# Patient Record
Sex: FEMALE | Race: WHITE | Employment: FULL TIME | URBAN - METROPOLITAN AREA
[De-identification: names, ages, dates, MRNs, and addresses within clinical notes are randomized per-mention and may not be internally consistent; named-entity substitution may affect disease eponyms.]

---

## 2018-02-22 ENCOUNTER — APPOINTMENT (OUTPATIENT)
Dept: CT IMAGING | Age: 43
DRG: 392 | End: 2018-02-22

## 2018-02-22 ENCOUNTER — HOSPITAL ENCOUNTER (INPATIENT)
Age: 43
LOS: 6 days | Discharge: HOME | DRG: 392 | End: 2018-02-28
Attending: EMERGENCY MEDICINE | Admitting: INTERNAL MEDICINE

## 2018-02-22 DIAGNOSIS — K57.92 ACUTE DIVERTICULITIS: ICD-10-CM

## 2018-02-22 DIAGNOSIS — R10.30 LOWER ABDOMINAL PAIN: Primary | ICD-10-CM

## 2018-02-22 PROBLEM — K57.33 DIVERTICULITIS LARGE INTESTINE W/O PERFORATION OR ABSCESS W/BLEEDING: Status: ACTIVE | Noted: 2018-02-22

## 2018-02-22 PROBLEM — Z87.19 HX OF DIVERTICULITIS OF COLON: Status: ACTIVE | Noted: 2018-02-22

## 2018-02-22 LAB
-: ABNORMAL
ABSOLUTE EOS #: 0.2 K/UL (ref 0–0.4)
ABSOLUTE IMMATURE GRANULOCYTE: ABNORMAL K/UL (ref 0–0.3)
ABSOLUTE LYMPH #: 2.4 K/UL (ref 1–4.8)
ABSOLUTE MONO #: 1.4 K/UL (ref 0.1–1.3)
ALBUMIN SERPL-MCNC: 4.1 G/DL (ref 3.5–5.2)
ALBUMIN/GLOBULIN RATIO: ABNORMAL (ref 1–2.5)
ALP BLD-CCNC: 68 U/L (ref 35–104)
ALT SERPL-CCNC: 10 U/L (ref 5–33)
AMORPHOUS: ABNORMAL
ANION GAP SERPL CALCULATED.3IONS-SCNC: 13 MMOL/L (ref 9–17)
AST SERPL-CCNC: 12 U/L
BACTERIA: ABNORMAL
BASOPHILS # BLD: 1 % (ref 0–2)
BASOPHILS ABSOLUTE: 0.1 K/UL (ref 0–0.2)
BILIRUB SERPL-MCNC: 0.36 MG/DL (ref 0.3–1.2)
BILIRUBIN URINE: NEGATIVE
BUN BLDV-MCNC: 14 MG/DL (ref 6–20)
BUN/CREAT BLD: ABNORMAL (ref 9–20)
CALCIUM SERPL-MCNC: 8.7 MG/DL (ref 8.6–10.4)
CASTS UA: ABNORMAL /LPF
CHLORIDE BLD-SCNC: 101 MMOL/L (ref 98–107)
CO2: 21 MMOL/L (ref 20–31)
COLOR: YELLOW
COMMENT UA: ABNORMAL
CREAT SERPL-MCNC: 0.65 MG/DL (ref 0.5–0.9)
CRYSTALS, UA: ABNORMAL /HPF
DIFFERENTIAL TYPE: ABNORMAL
EOSINOPHILS RELATIVE PERCENT: 2 % (ref 0–4)
EPITHELIAL CELLS UA: ABNORMAL /HPF
GFR AFRICAN AMERICAN: >60 ML/MIN
GFR NON-AFRICAN AMERICAN: >60 ML/MIN
GFR SERPL CREATININE-BSD FRML MDRD: ABNORMAL ML/MIN/{1.73_M2}
GFR SERPL CREATININE-BSD FRML MDRD: ABNORMAL ML/MIN/{1.73_M2}
GLUCOSE BLD-MCNC: 121 MG/DL (ref 70–99)
GLUCOSE URINE: NEGATIVE
HCG(URINE) PREGNANCY TEST: NEGATIVE
HCT VFR BLD CALC: 38.2 % (ref 36–46)
HEMOGLOBIN: 12.3 G/DL (ref 12–16)
IMMATURE GRANULOCYTES: ABNORMAL %
KETONES, URINE: NEGATIVE
LEUKOCYTE ESTERASE, URINE: NEGATIVE
LYMPHOCYTES # BLD: 17 % (ref 24–44)
MAGNESIUM: 2 MG/DL (ref 1.6–2.6)
MCH RBC QN AUTO: 27.8 PG (ref 26–34)
MCHC RBC AUTO-ENTMCNC: 32.3 G/DL (ref 31–37)
MCV RBC AUTO: 86.1 FL (ref 80–100)
MONOCYTES # BLD: 10 % (ref 1–7)
MUCUS: ABNORMAL
NITRITE, URINE: NEGATIVE
NRBC AUTOMATED: ABNORMAL PER 100 WBC
OTHER OBSERVATIONS UA: ABNORMAL
PDW BLD-RTO: 15.5 % (ref 11.5–14.9)
PH UA: 5.5 (ref 5–8)
PLATELET # BLD: 258 K/UL (ref 150–450)
PLATELET ESTIMATE: ABNORMAL
PMV BLD AUTO: 8.9 FL (ref 6–12)
POTASSIUM SERPL-SCNC: 3.9 MMOL/L (ref 3.7–5.3)
PROTEIN UA: NEGATIVE
RBC # BLD: 4.43 M/UL (ref 4–5.2)
RBC # BLD: ABNORMAL 10*6/UL
RBC UA: ABNORMAL /HPF
RENAL EPITHELIAL, UA: ABNORMAL /HPF
SEG NEUTROPHILS: 70 % (ref 36–66)
SEGMENTED NEUTROPHILS ABSOLUTE COUNT: 10 K/UL (ref 1.3–9.1)
SODIUM BLD-SCNC: 135 MMOL/L (ref 135–144)
SPECIFIC GRAVITY UA: 1.01 (ref 1–1.03)
TOTAL PROTEIN: 7 G/DL (ref 6.4–8.3)
TRICHOMONAS: ABNORMAL
TURBIDITY: CLEAR
URINE HGB: ABNORMAL
UROBILINOGEN, URINE: NORMAL
WBC # BLD: 14.1 K/UL (ref 3.5–11)
WBC # BLD: ABNORMAL 10*3/UL
WBC UA: ABNORMAL /HPF
YEAST: ABNORMAL

## 2018-02-22 PROCEDURE — 6360000002 HC RX W HCPCS: Performed by: INTERNAL MEDICINE

## 2018-02-22 PROCEDURE — 74177 CT ABD & PELVIS W/CONTRAST: CPT

## 2018-02-22 PROCEDURE — 1200000000 HC SEMI PRIVATE

## 2018-02-22 PROCEDURE — 85025 COMPLETE CBC W/AUTO DIFF WBC: CPT

## 2018-02-22 PROCEDURE — 6360000002 HC RX W HCPCS: Performed by: STUDENT IN AN ORGANIZED HEALTH CARE EDUCATION/TRAINING PROGRAM

## 2018-02-22 PROCEDURE — 83735 ASSAY OF MAGNESIUM: CPT

## 2018-02-22 PROCEDURE — 80053 COMPREHEN METABOLIC PANEL: CPT

## 2018-02-22 PROCEDURE — 2500000003 HC RX 250 WO HCPCS: Performed by: INTERNAL MEDICINE

## 2018-02-22 PROCEDURE — 81001 URINALYSIS AUTO W/SCOPE: CPT

## 2018-02-22 PROCEDURE — 96374 THER/PROPH/DIAG INJ IV PUSH: CPT

## 2018-02-22 PROCEDURE — 84703 CHORIONIC GONADOTROPIN ASSAY: CPT

## 2018-02-22 PROCEDURE — 96375 TX/PRO/DX INJ NEW DRUG ADDON: CPT

## 2018-02-22 PROCEDURE — S0028 INJECTION, FAMOTIDINE, 20 MG: HCPCS | Performed by: EMERGENCY MEDICINE

## 2018-02-22 PROCEDURE — 6370000000 HC RX 637 (ALT 250 FOR IP): Performed by: STUDENT IN AN ORGANIZED HEALTH CARE EDUCATION/TRAINING PROGRAM

## 2018-02-22 PROCEDURE — 6360000002 HC RX W HCPCS: Performed by: EMERGENCY MEDICINE

## 2018-02-22 PROCEDURE — 2500000003 HC RX 250 WO HCPCS: Performed by: STUDENT IN AN ORGANIZED HEALTH CARE EDUCATION/TRAINING PROGRAM

## 2018-02-22 PROCEDURE — 36415 COLL VENOUS BLD VENIPUNCTURE: CPT

## 2018-02-22 PROCEDURE — 6360000002 HC RX W HCPCS: Performed by: SURGERY

## 2018-02-22 PROCEDURE — 99223 1ST HOSP IP/OBS HIGH 75: CPT | Performed by: INTERNAL MEDICINE

## 2018-02-22 PROCEDURE — 6360000004 HC RX CONTRAST MEDICATION: Performed by: EMERGENCY MEDICINE

## 2018-02-22 PROCEDURE — 99285 EMERGENCY DEPT VISIT HI MDM: CPT

## 2018-02-22 PROCEDURE — 2580000003 HC RX 258: Performed by: EMERGENCY MEDICINE

## 2018-02-22 PROCEDURE — 2500000003 HC RX 250 WO HCPCS: Performed by: EMERGENCY MEDICINE

## 2018-02-22 RX ORDER — MORPHINE SULFATE 4 MG/ML
4 INJECTION, SOLUTION INTRAMUSCULAR; INTRAVENOUS ONCE
Status: COMPLETED | OUTPATIENT
Start: 2018-02-22 | End: 2018-02-22

## 2018-02-22 RX ORDER — SODIUM CHLORIDE 0.9 % (FLUSH) 0.9 %
10 SYRINGE (ML) INJECTION EVERY 12 HOURS SCHEDULED
Status: DISCONTINUED | OUTPATIENT
Start: 2018-02-22 | End: 2018-02-28 | Stop reason: HOSPADM

## 2018-02-22 RX ORDER — ONDANSETRON 2 MG/ML
4 INJECTION INTRAMUSCULAR; INTRAVENOUS EVERY 6 HOURS PRN
Status: DISCONTINUED | OUTPATIENT
Start: 2018-02-22 | End: 2018-02-22

## 2018-02-22 RX ORDER — ACETAMINOPHEN 325 MG/1
650 TABLET ORAL EVERY 4 HOURS PRN
Status: DISCONTINUED | OUTPATIENT
Start: 2018-02-22 | End: 2018-02-22 | Stop reason: SDUPTHER

## 2018-02-22 RX ORDER — MORPHINE SULFATE 2 MG/ML
1 INJECTION, SOLUTION INTRAMUSCULAR; INTRAVENOUS
Status: DISCONTINUED | OUTPATIENT
Start: 2018-02-22 | End: 2018-02-23 | Stop reason: DRUGHIGH

## 2018-02-22 RX ORDER — 0.9 % SODIUM CHLORIDE 0.9 %
100 INTRAVENOUS SOLUTION INTRAVENOUS ONCE
Status: COMPLETED | OUTPATIENT
Start: 2018-02-22 | End: 2018-02-22

## 2018-02-22 RX ORDER — MORPHINE SULFATE 2 MG/ML
2 INJECTION, SOLUTION INTRAMUSCULAR; INTRAVENOUS
Status: DISCONTINUED | OUTPATIENT
Start: 2018-02-22 | End: 2018-02-22 | Stop reason: SDUPTHER

## 2018-02-22 RX ORDER — MORPHINE SULFATE 2 MG/ML
2 INJECTION, SOLUTION INTRAMUSCULAR; INTRAVENOUS
Status: DISCONTINUED | OUTPATIENT
Start: 2018-02-22 | End: 2018-02-23 | Stop reason: DRUGHIGH

## 2018-02-22 RX ORDER — DEXTROSE, SODIUM CHLORIDE, AND POTASSIUM CHLORIDE 5; .45; .15 G/100ML; G/100ML; G/100ML
INJECTION INTRAVENOUS CONTINUOUS
Status: DISCONTINUED | OUTPATIENT
Start: 2018-02-22 | End: 2018-02-23

## 2018-02-22 RX ORDER — MORPHINE SULFATE 2 MG/ML
1 INJECTION, SOLUTION INTRAMUSCULAR; INTRAVENOUS 3 TIMES DAILY PRN
Status: DISCONTINUED | OUTPATIENT
Start: 2018-02-22 | End: 2018-02-22

## 2018-02-22 RX ORDER — 0.9 % SODIUM CHLORIDE 0.9 %
1000 INTRAVENOUS SOLUTION INTRAVENOUS ONCE
Status: COMPLETED | OUTPATIENT
Start: 2018-02-22 | End: 2018-02-22

## 2018-02-22 RX ORDER — SODIUM CHLORIDE 0.9 % (FLUSH) 0.9 %
10 SYRINGE (ML) INJECTION PRN
Status: DISCONTINUED | OUTPATIENT
Start: 2018-02-22 | End: 2018-02-22 | Stop reason: SDUPTHER

## 2018-02-22 RX ORDER — ACETAMINOPHEN 325 MG/1
650 TABLET ORAL EVERY 4 HOURS PRN
Status: DISCONTINUED | OUTPATIENT
Start: 2018-02-22 | End: 2018-02-22

## 2018-02-22 RX ORDER — MORPHINE SULFATE 2 MG/ML
2 INJECTION, SOLUTION INTRAMUSCULAR; INTRAVENOUS
Status: DISCONTINUED | OUTPATIENT
Start: 2018-02-22 | End: 2018-02-22

## 2018-02-22 RX ORDER — ONDANSETRON 2 MG/ML
8 INJECTION INTRAMUSCULAR; INTRAVENOUS ONCE
Status: COMPLETED | OUTPATIENT
Start: 2018-02-22 | End: 2018-02-22

## 2018-02-22 RX ORDER — CIPROFLOXACIN 2 MG/ML
400 INJECTION, SOLUTION INTRAVENOUS EVERY 12 HOURS
Status: DISCONTINUED | OUTPATIENT
Start: 2018-02-22 | End: 2018-02-23

## 2018-02-22 RX ORDER — ONDANSETRON 2 MG/ML
4 INJECTION INTRAMUSCULAR; INTRAVENOUS EVERY 4 HOURS PRN
Status: DISCONTINUED | OUTPATIENT
Start: 2018-02-22 | End: 2018-02-28 | Stop reason: HOSPADM

## 2018-02-22 RX ORDER — MORPHINE SULFATE 2 MG/ML
2 INJECTION, SOLUTION INTRAMUSCULAR; INTRAVENOUS 3 TIMES DAILY PRN
Status: DISCONTINUED | OUTPATIENT
Start: 2018-02-22 | End: 2018-02-22

## 2018-02-22 RX ORDER — SODIUM CHLORIDE 0.9 % (FLUSH) 0.9 %
10 SYRINGE (ML) INJECTION PRN
Status: DISCONTINUED | OUTPATIENT
Start: 2018-02-22 | End: 2018-02-28 | Stop reason: HOSPADM

## 2018-02-22 RX ADMIN — Medication 10 ML: at 09:07

## 2018-02-22 RX ADMIN — ONDANSETRON 8 MG: 2 INJECTION INTRAMUSCULAR; INTRAVENOUS at 08:29

## 2018-02-22 RX ADMIN — MORPHINE SULFATE 4 MG: 4 INJECTION, SOLUTION INTRAMUSCULAR; INTRAVENOUS at 10:20

## 2018-02-22 RX ADMIN — FAMOTIDINE 20 MG: 10 INJECTION, SOLUTION INTRAVENOUS at 08:29

## 2018-02-22 RX ADMIN — IOPAMIDOL 100 ML: 755 INJECTION, SOLUTION INTRAVENOUS at 09:07

## 2018-02-22 RX ADMIN — CIPROFLOXACIN 400 MG: 2 INJECTION, SOLUTION INTRAVENOUS at 15:49

## 2018-02-22 RX ADMIN — POTASSIUM CHLORIDE, DEXTROSE MONOHYDRATE AND SODIUM CHLORIDE: 150; 5; 450 INJECTION, SOLUTION INTRAVENOUS at 11:35

## 2018-02-22 RX ADMIN — SODIUM CHLORIDE 1000 ML: 9 INJECTION, SOLUTION INTRAVENOUS at 08:29

## 2018-02-22 RX ADMIN — MORPHINE SULFATE 2 MG: 2 INJECTION, SOLUTION INTRAMUSCULAR; INTRAVENOUS at 21:14

## 2018-02-22 RX ADMIN — MORPHINE SULFATE 4 MG: 4 INJECTION, SOLUTION INTRAMUSCULAR; INTRAVENOUS at 08:29

## 2018-02-22 RX ADMIN — CEFTRIAXONE SODIUM 1 G: 1 INJECTION, POWDER, FOR SOLUTION INTRAMUSCULAR; INTRAVENOUS at 10:32

## 2018-02-22 RX ADMIN — MAGNESIUM HYDROXIDE 30 ML: 400 SUSPENSION ORAL at 16:08

## 2018-02-22 RX ADMIN — METRONIDAZOLE 500 MG: 500 INJECTION, SOLUTION INTRAVENOUS at 18:13

## 2018-02-22 RX ADMIN — METRONIDAZOLE 500 MG: 500 SOLUTION INTRAVENOUS at 10:42

## 2018-02-22 RX ADMIN — SODIUM CHLORIDE 100 ML: 9 INJECTION, SOLUTION INTRAVENOUS at 09:08

## 2018-02-22 RX ADMIN — MORPHINE SULFATE 2 MG: 2 INJECTION, SOLUTION INTRAMUSCULAR; INTRAVENOUS at 16:12

## 2018-02-22 RX ADMIN — ENOXAPARIN SODIUM 40 MG: 40 INJECTION SUBCUTANEOUS at 11:35

## 2018-02-22 ASSESSMENT — PAIN DESCRIPTION - ORIENTATION
ORIENTATION: LEFT;RIGHT;LOWER
ORIENTATION: RIGHT;LEFT;LOWER

## 2018-02-22 ASSESSMENT — ENCOUNTER SYMPTOMS
VOMITING: 0
BLURRED VISION: 0
DIARRHEA: 0
SHORTNESS OF BREATH: 0
COUGH: 0
HEMATEMESIS: 0
CONSTIPATION: 0
ABDOMINAL PAIN: 1
SPUTUM PRODUCTION: 0
BLOOD IN STOOL: 0
CONSTIPATION: 1
NAUSEA: 0
HEMATOCHEZIA: 0
DOUBLE VISION: 0

## 2018-02-22 ASSESSMENT — PAIN DESCRIPTION - LOCATION
LOCATION: ABDOMEN

## 2018-02-22 ASSESSMENT — PAIN SCALES - GENERAL
PAINLEVEL_OUTOF10: 5
PAINLEVEL_OUTOF10: 8
PAINLEVEL_OUTOF10: 7
PAINLEVEL_OUTOF10: 7
PAINLEVEL_OUTOF10: 5
PAINLEVEL_OUTOF10: 8
PAINLEVEL_OUTOF10: 8

## 2018-02-22 ASSESSMENT — PAIN DESCRIPTION - PAIN TYPE
TYPE: ACUTE PAIN

## 2018-02-22 ASSESSMENT — PAIN DESCRIPTION - DESCRIPTORS: DESCRIPTORS: ACHING

## 2018-02-22 NOTE — ED NOTES
RN in to pt room and pt was eating [employer] . Pt instructed she should not be eating , pt cont to eat, and said she was going to eat a couple more bites.      [facility]  42/[birth year] [id number]

## 2018-02-22 NOTE — H&P
tablet Take 800 mg by mouth 2 times daily as needed for Pain. Historical Provider, MD        Allergies:     Review of patient's allergies indicates no known allergies. Social History:     Tobacco:    reports that she has been smoking. She has been smoking about 0.75 packs per day. She has never used smokeless tobacco.  Alcohol:      reports that she drinks alcohol. Drug Use:  reports that she does not use drugs. Family History:     Family History   Problem Relation Age of Onset    Arthritis Mother     High Blood Pressure Mother        Review of Systems:     Review of Systems   Constitutional: Positive for fever. Negative for chills. Eyes: Negative for blurred vision and double vision. Respiratory: Negative for cough, sputum production and shortness of breath. Cardiovascular: Negative for chest pain and leg swelling. Gastrointestinal: Positive for abdominal pain and constipation. Negative for blood in stool, diarrhea, nausea and vomiting. Genitourinary: Negative for dysuria and urgency. Musculoskeletal: Negative for falls and myalgias. Skin: Negative for rash. Neurological: Negative for dizziness, weakness and headaches. Psychiatric/Behavioral: Negative for depression. The patient is not nervous/anxious. Physical Exam:   BP (!) 101/52   Pulse 59   Temp 98.5 °F (36.9 °C) (Oral)   Resp 16   Ht 5' 6\" (1.676 m)   Wt 174 lb 13.2 oz (79.3 kg)   SpO2 98%   BMI 28.22 kg/m²   Temp (24hrs), Av.2 °F (36.8 °C), Min:97.8 °F (36.6 °C), Max:98.5 °F (36.9 °C)    No results for input(s): POCGLU in the last 72 hours. No intake or output data in the 24 hours ending 18 1306    Physical Exam   Constitutional: She is well-developed, well-nourished, and in no distress. No distress. HENT:   Head: Normocephalic and atraumatic. Neck: Normal range of motion. Neck supple. No JVD present. Cardiovascular: Normal rate, regular rhythm, normal heart sounds and intact distal pulses. Automated NOT REPORTED per 100 WBC    Differential Type NOT REPORTED     Seg Neutrophils 70 (H) 36 - 66 %    Lymphocytes 17 (L) 24 - 44 %    Monocytes 10 (H) 1 - 7 %    Eosinophils % 2 0 - 4 %    Basophils 1 0 - 2 %    Immature Granulocytes NOT REPORTED 0 %    Segs Absolute 10.00 (H) 1.3 - 9.1 k/uL    Absolute Lymph # 2.40 1.0 - 4.8 k/uL    Absolute Mono # 1.40 (H) 0.1 - 1.3 k/uL    Absolute Eos # 0.20 0.0 - 0.4 k/uL    Basophils # 0.10 0.0 - 0.2 k/uL    Absolute Immature Granulocyte NOT REPORTED 0.00 - 0.30 k/uL    WBC Morphology NOT REPORTED     RBC Morphology NOT REPORTED     Platelet Estimate NOT REPORTED    Comprehensive Metabolic Panel    Collection Time: 02/22/18  8:20 AM   Result Value Ref Range    Glucose 121 (H) 70 - 99 mg/dL    BUN 14 6 - 20 mg/dL    CREATININE 0.65 0.50 - 0.90 mg/dL    Bun/Cre Ratio NOT REPORTED 9 - 20    Calcium 8.7 8.6 - 10.4 mg/dL    Sodium 135 135 - 144 mmol/L    Potassium 3.9 3.7 - 5.3 mmol/L    Chloride 101 98 - 107 mmol/L    CO2 21 20 - 31 mmol/L    Anion Gap 13 9 - 17 mmol/L    Alkaline Phosphatase 68 35 - 104 U/L    ALT 10 5 - 33 U/L    AST 12 <32 U/L    Total Bilirubin 0.36 0.3 - 1.2 mg/dL    Total Protein 7.0 6.4 - 8.3 g/dL    Alb 4.1 3.5 - 5.2 g/dL    Albumin/Globulin Ratio NOT REPORTED 1.0 - 2.5    GFR Non-African American >60 >60 mL/min    GFR African American >60 >60 mL/min    GFR Comment          GFR Staging NOT REPORTED    Magnesium    Collection Time: 02/22/18  8:20 AM   Result Value Ref Range    Magnesium 2.0 1.6 - 2.6 mg/dL       Imaging/Diagnostics:  Ct Abdomen Pelvis W Iv Contrast Additional Contrast? None    Result Date: 2/22/2018  EXAMINATION: CT OF THE ABDOMEN AND PELVIS WITH CONTRAST 2/22/2018 9:10 am TECHNIQUE: CT of the abdomen and pelvis was performed with the administration of intravenous contrast. Multiplanar reformatted images are provided for review.  Dose modulation, iterative reconstruction, and/or weight based adjustment of the mA/kV was utilized to reduce the radiation dose to as low as reasonably achievable. COMPARISON: None HISTORY: ORDERING SYSTEM PROVIDED HISTORY: low abd pain, diverticulitis vs appendicitis TECHNOLOGIST PROVIDED HISTORY: Additional Contrast?->None Ordering Physician Provided Reason for Exam: LOW ABD PAIN X 2 DAYS, HX DIVERTICULITIS Acuity: Unknown Type of Exam: Unknown FINDINGS: Lower Chest: No significant abnormality at the lung bases. Organs: No focal liver lesion. There is cholelithiasis in an otherwise unremarkable gallbladder. No biliary ductal dilatation. The spleen is normal in size without focal lesion. The pancreas and the adrenal glands are unremarkable. The kidneys enhance symmetrically without collecting system dilatation. There is a 5 mm left intrarenal calculus. Scattered bilateral subcentimeter low-density renal lesions are too small to characterize, but statistically likely reflect cysts. A representative measures 8 mm in the right upper pole kidney. No concerning renal lesion. GI/Bowel: CT confirms that there is short segment diffuse moderate bowel wall thickening involving the sigmoid colon where there are diverticula. This is associated with moderate pericolonic fat stranding. No pneumatosis, extraluminal air or focal fluid collection/abscess formation. The appendix is normal. Pelvis: Heterogeneous and enlarged uterus suggestive of the presence of fibroids versus adenomyosis. The adnexa/ovaries are within normal limits. The partially distended bladder is unremarkable. Trace dependent pelvic fluid is felt to be physiologic. Peritoneum/Retroperitoneum:   No abdominal lymphadenopathy or ascites. Bones/Soft Tissues: Tiny fat containing umbilical hernia is present. No significant osseous abnormality. Acute uncomplicated sigmoid diverticulitis. Nonobstructing left nephrolithiasis.      Assessment :      Primary Problem  Diverticulitis large intestine w/o perforation or abscess w/bleeding    [facility]

## 2018-02-22 NOTE — PLAN OF CARE
Problem: Fluid Volume - Imbalance:  Goal: Absence of imbalanced fluid volume signs and symptoms  Absence of imbalanced fluid volume signs and symptoms   Outcome: Ongoing      Problem:  Bowel/Gastric:  Goal: Bowel function will improve  Bowel function will improve   Outcome: Ongoing    Goal: Diagnostic test results will improve  Diagnostic test results will improve   Outcome: Ongoing    Goal: Occurrences of nausea will decrease  Occurrences of nausea will decrease   Outcome: Ongoing    Goal: Occurrences of vomiting will decrease  Occurrences of vomiting will decrease   Outcome: Ongoing      Problem: Pain:  Goal: Pain level will decrease  Pain level will decrease   Outcome: Ongoing    Goal: Control of acute pain  Control of acute pain   Outcome: Ongoing    Goal: Control of chronic pain  Control of chronic pain   Outcome: Ongoing

## 2018-02-22 NOTE — ED PROVIDER NOTES
[address] ED  eMERGENCY dEPARTMENT eNCOUnter    Pt Name: Tamara Rivero  MRN: [id number]  [place] 1975  Date of evaluation: 2/22/18  CHIEF COMPLAINT       Chief Complaint   Patient presents with    Abdominal Pain     HISTORY OF PRESENT ILLNESS     Abdominal Pain   Pain location:  Suprapubic, RLQ and LLQ  Pain quality: aching    Pain radiates to:  Does not radiate  Pain severity:  Severe  Onset quality:  Gradual  Duration:  3 days  Timing:  Constant  Progression:  Worsening  Chronicity:  New  Relieved by:  Nothing  Worsened by:  Nothing  Ineffective treatments:  None tried  Associated symptoms: no chest pain, no constipation, no diarrhea, no dysuria, no fatigue, no fever, no hematemesis, no hematochezia, no hematuria, no melena, no nausea, no vaginal bleeding, no vaginal discharge and no vomiting        REVIEW OF SYSTEMS     Review of Systems   Constitutional: Negative for fatigue and fever. Cardiovascular: Negative for chest pain. Gastrointestinal: Positive for abdominal pain. Negative for constipation, diarrhea, hematemesis, hematochezia, melena, nausea and vomiting. Genitourinary: Negative for dysuria, hematuria, vaginal bleeding and vaginal discharge. All other systems reviewed and are negative. PAST MEDICAL HISTORY     Past Medical History:   Diagnosis Date    Cancer [facility])     CERVIX    Wears glasses     Wears partial dentures     UPPER     SURGICAL HISTORY       Past Surgical History:   Procedure Laterality Date    CARPAL TUNNEL RELEASE Left 6-17-14    CERVIX SURGERY      CANCER REMOVED FROM CERVIX     CURRENT MEDICATIONS       Previous Medications    IBUPROFEN (ADVIL;MOTRIN) 800 MG TABLET    Take 800 mg by mouth 2 times daily as needed for Pain. ALLERGIES     has No Known Allergies. FAMILY HISTORY     indicated that her mother is alive. She indicated that her father is alive. SOCIAL HISTORY      reports that she has been smoking.   She has been smoking about 0.75 packs per

## 2018-02-22 NOTE — CARE COORDINATION
ADMISSION NOTE       Patient admitted to room  2071. Time of admit:  1115    Admit from:  ER    Reason for admission:  Diverticulitis    Where patient has been residing for the last 24 hrs:  Private residence    Has the patient been admitted to any facility in the last 4 weeks, which one:  No    Family at bedside:  NO    Patient is currently in pain yes    Patient has been oriented to room, educated on how to use call light, and to call for assistance prior to getting up. Bed in lowest and locked position. 2 siderails up for safety. Call light within reach. Johana. Renee Castro 44  DVT Prophylaxis and Vaccine Status  Work List  Mandatory for all patients      Patient must be on both Chemical prophylaxis and Mechanical prophylaxis.  If chemical/mechanical prophylaxis is not ordered, the physician must document a reason for not using prophylaxis     Chemical Prophylaxis  Is patient on chemical prophylaxis: Yes  If no chemical prophylaxis Is a order in for No Chemical VTE prophylaxisNo  If no was the physician notified not applicable      Mechanical Prophylaxis  Is patient on mechanical prophylaxis, intermittent pneumatic compression device: No  If no was the physician notified Order for No Mechanical        Pneumonia Vaccine  Vaccine indicated:  Not indicated  If indicated was the vaccine given: not applicable    Influenza Vaccine (applicable from October through March):  Vaccine indicated: Vaccination refused  If indicated was the vaccine given: not applicable    Patient Education  Education completed on DVT prophylaxis: yes

## 2018-02-23 LAB
ABSOLUTE EOS #: 0.1 K/UL (ref 0–0.4)
ABSOLUTE IMMATURE GRANULOCYTE: ABNORMAL K/UL (ref 0–0.3)
ABSOLUTE LYMPH #: 1.9 K/UL (ref 1–4.8)
ABSOLUTE MONO #: 1.2 K/UL (ref 0.1–1.3)
ANION GAP SERPL CALCULATED.3IONS-SCNC: 11 MMOL/L (ref 9–17)
BASOPHILS # BLD: 0 % (ref 0–2)
BASOPHILS ABSOLUTE: 0 K/UL (ref 0–0.2)
BUN BLDV-MCNC: 8 MG/DL (ref 6–20)
BUN/CREAT BLD: ABNORMAL (ref 9–20)
CALCIUM SERPL-MCNC: 8.5 MG/DL (ref 8.6–10.4)
CHLORIDE BLD-SCNC: 101 MMOL/L (ref 98–107)
CO2: 23 MMOL/L (ref 20–31)
CREAT SERPL-MCNC: 0.68 MG/DL (ref 0.5–0.9)
DIFFERENTIAL TYPE: ABNORMAL
EOSINOPHILS RELATIVE PERCENT: 1 % (ref 0–4)
GFR AFRICAN AMERICAN: >60 ML/MIN
GFR NON-AFRICAN AMERICAN: >60 ML/MIN
GFR SERPL CREATININE-BSD FRML MDRD: ABNORMAL ML/MIN/{1.73_M2}
GFR SERPL CREATININE-BSD FRML MDRD: ABNORMAL ML/MIN/{1.73_M2}
GLUCOSE BLD-MCNC: 117 MG/DL (ref 70–99)
HCT VFR BLD CALC: 36.3 % (ref 36–46)
HEMOGLOBIN: 12 G/DL (ref 12–16)
IMMATURE GRANULOCYTES: ABNORMAL %
LYMPHOCYTES # BLD: 13 % (ref 24–44)
MCH RBC QN AUTO: 28.6 PG (ref 26–34)
MCHC RBC AUTO-ENTMCNC: 33.1 G/DL (ref 31–37)
MCV RBC AUTO: 86.4 FL (ref 80–100)
MONOCYTES # BLD: 8 % (ref 1–7)
NRBC AUTOMATED: ABNORMAL PER 100 WBC
PDW BLD-RTO: 15.4 % (ref 11.5–14.9)
PLATELET # BLD: 244 K/UL (ref 150–450)
PLATELET ESTIMATE: ABNORMAL
PMV BLD AUTO: 9.2 FL (ref 6–12)
POTASSIUM SERPL-SCNC: 4.2 MMOL/L (ref 3.7–5.3)
RBC # BLD: 4.21 M/UL (ref 4–5.2)
RBC # BLD: ABNORMAL 10*6/UL
SEG NEUTROPHILS: 78 % (ref 36–66)
SEGMENTED NEUTROPHILS ABSOLUTE COUNT: 11.2 K/UL (ref 1.3–9.1)
SODIUM BLD-SCNC: 135 MMOL/L (ref 135–144)
WBC # BLD: 14.5 K/UL (ref 3.5–11)
WBC # BLD: ABNORMAL 10*3/UL

## 2018-02-23 PROCEDURE — 1200000000 HC SEMI PRIVATE

## 2018-02-23 PROCEDURE — 36415 COLL VENOUS BLD VENIPUNCTURE: CPT

## 2018-02-23 PROCEDURE — 85025 COMPLETE CBC W/AUTO DIFF WBC: CPT

## 2018-02-23 PROCEDURE — 6360000002 HC RX W HCPCS: Performed by: STUDENT IN AN ORGANIZED HEALTH CARE EDUCATION/TRAINING PROGRAM

## 2018-02-23 PROCEDURE — 6360000002 HC RX W HCPCS: Performed by: SURGERY

## 2018-02-23 PROCEDURE — 99232 SBSQ HOSP IP/OBS MODERATE 35: CPT | Performed by: INTERNAL MEDICINE

## 2018-02-23 PROCEDURE — 2500000003 HC RX 250 WO HCPCS: Performed by: INTERNAL MEDICINE

## 2018-02-23 PROCEDURE — 6370000000 HC RX 637 (ALT 250 FOR IP): Performed by: SURGERY

## 2018-02-23 PROCEDURE — 80048 BASIC METABOLIC PNL TOTAL CA: CPT

## 2018-02-23 PROCEDURE — 2500000003 HC RX 250 WO HCPCS: Performed by: STUDENT IN AN ORGANIZED HEALTH CARE EDUCATION/TRAINING PROGRAM

## 2018-02-23 PROCEDURE — 2580000003 HC RX 258: Performed by: SURGERY

## 2018-02-23 RX ORDER — MORPHINE SULFATE 4 MG/ML
4 INJECTION, SOLUTION INTRAMUSCULAR; INTRAVENOUS
Status: DISCONTINUED | OUTPATIENT
Start: 2018-02-23 | End: 2018-02-28 | Stop reason: HOSPADM

## 2018-02-23 RX ORDER — SODIUM CHLORIDE 9 MG/ML
INJECTION, SOLUTION INTRAVENOUS CONTINUOUS
Status: DISCONTINUED | OUTPATIENT
Start: 2018-02-23 | End: 2018-02-28 | Stop reason: HOSPADM

## 2018-02-23 RX ORDER — LACTOBACILLUS RHAMNOSUS GG 10B CELL
1 CAPSULE ORAL 2 TIMES DAILY
Status: DISCONTINUED | OUTPATIENT
Start: 2018-02-23 | End: 2018-02-28 | Stop reason: HOSPADM

## 2018-02-23 RX ADMIN — PIPERACILLIN SODIUM,TAZOBACTAM SODIUM 3.38 G: 3; .375 INJECTION, POWDER, FOR SOLUTION INTRAVENOUS at 13:32

## 2018-02-23 RX ADMIN — PIPERACILLIN SODIUM,TAZOBACTAM SODIUM 3.38 G: 3; .375 INJECTION, POWDER, FOR SOLUTION INTRAVENOUS at 18:26

## 2018-02-23 RX ADMIN — Medication 1 CAPSULE: at 20:18

## 2018-02-23 RX ADMIN — MORPHINE SULFATE 4 MG: 4 INJECTION, SOLUTION INTRAMUSCULAR; INTRAVENOUS at 18:26

## 2018-02-23 RX ADMIN — ENOXAPARIN SODIUM 40 MG: 40 INJECTION SUBCUTANEOUS at 09:09

## 2018-02-23 RX ADMIN — CIPROFLOXACIN 400 MG: 2 INJECTION, SOLUTION INTRAVENOUS at 02:07

## 2018-02-23 RX ADMIN — MORPHINE SULFATE 2 MG: 2 INJECTION, SOLUTION INTRAMUSCULAR; INTRAVENOUS at 01:03

## 2018-02-23 RX ADMIN — SODIUM CHLORIDE: 9 INJECTION, SOLUTION INTRAVENOUS at 12:19

## 2018-02-23 RX ADMIN — MORPHINE SULFATE 4 MG: 4 INJECTION, SOLUTION INTRAMUSCULAR; INTRAVENOUS at 11:15

## 2018-02-23 RX ADMIN — MORPHINE SULFATE 2 MG: 2 INJECTION, SOLUTION INTRAMUSCULAR; INTRAVENOUS at 03:38

## 2018-02-23 RX ADMIN — METRONIDAZOLE 500 MG: 500 INJECTION, SOLUTION INTRAVENOUS at 01:05

## 2018-02-23 RX ADMIN — POTASSIUM CHLORIDE, DEXTROSE MONOHYDRATE AND SODIUM CHLORIDE: 150; 5; 450 INJECTION, SOLUTION INTRAVENOUS at 00:10

## 2018-02-23 RX ADMIN — MORPHINE SULFATE 4 MG: 4 INJECTION, SOLUTION INTRAMUSCULAR; INTRAVENOUS at 22:39

## 2018-02-23 RX ADMIN — MORPHINE SULFATE 2 MG: 2 INJECTION, SOLUTION INTRAMUSCULAR; INTRAVENOUS at 05:50

## 2018-02-23 RX ADMIN — MORPHINE SULFATE 2 MG: 2 INJECTION, SOLUTION INTRAMUSCULAR; INTRAVENOUS at 09:07

## 2018-02-23 ASSESSMENT — PAIN DESCRIPTION - PAIN TYPE
TYPE: ACUTE PAIN

## 2018-02-23 ASSESSMENT — PAIN DESCRIPTION - ONSET
ONSET: ON-GOING

## 2018-02-23 ASSESSMENT — PAIN SCALES - GENERAL
PAINLEVEL_OUTOF10: 7
PAINLEVEL_OUTOF10: 7
PAINLEVEL_OUTOF10: 5
PAINLEVEL_OUTOF10: 9
PAINLEVEL_OUTOF10: 5
PAINLEVEL_OUTOF10: 5
PAINLEVEL_OUTOF10: 6
PAINLEVEL_OUTOF10: 7
PAINLEVEL_OUTOF10: 4
PAINLEVEL_OUTOF10: 3
PAINLEVEL_OUTOF10: 5
PAINLEVEL_OUTOF10: 7
PAINLEVEL_OUTOF10: 5
PAINLEVEL_OUTOF10: 7
PAINLEVEL_OUTOF10: 5
PAINLEVEL_OUTOF10: 8

## 2018-02-23 ASSESSMENT — PAIN DESCRIPTION - DESCRIPTORS
DESCRIPTORS: DULL;ACHING
DESCRIPTORS: ACHING;DULL

## 2018-02-23 ASSESSMENT — PAIN DESCRIPTION - FREQUENCY
FREQUENCY: CONTINUOUS

## 2018-02-23 ASSESSMENT — PAIN DESCRIPTION - LOCATION
LOCATION: ABDOMEN
LOCATION: ABDOMEN;BACK
LOCATION: ABDOMEN;BACK
LOCATION: ABDOMEN

## 2018-02-23 ASSESSMENT — PAIN DESCRIPTION - PROGRESSION
CLINICAL_PROGRESSION: NOT CHANGED
CLINICAL_PROGRESSION: GRADUALLY IMPROVING
CLINICAL_PROGRESSION: GRADUALLY IMPROVING
CLINICAL_PROGRESSION: NOT CHANGED

## 2018-02-23 NOTE — PROGRESS NOTES
General Surgery Progress Note            PATIENT NAME: Amy Morris     TODAY'S DATE: 2/23/2018, 10:50 AM    SUBJECTIVE:    Pt  seen and examined. Continues to complain of pain     OBJECTIVE:   VITALS:  BP (!) 106/54   Pulse 72   Temp 99.3 °F (37.4 °C) (Oral)   Resp 18   Ht 5' 6\" (1.676 m)   Wt 174 lb 13.2 oz (79.3 kg)   SpO2 94%   BMI 28.22 kg/m²      INTAKE/OUTPUT:      Intake/Output Summary (Last 24 hours) at 02/23/18 1050  Last data filed at 02/23/18 0815   Gross per 24 hour   Intake             2046 ml   Output             2650 ml   Net             -604 ml                 CONSTITUTIONAL:  awake and alert. no apparent distress  HEART:   Regular  LUNGS:   Clear  ABDOMEN:   Abdomen soft, tender in the left lower quadrant and suprapubic areas with voluntary guarding, non-distended.   Hypoactive bowel sounds  EXTREMITIES:   No edema or tenderness    Data:  CBC with Differential:    Lab Results   Component Value Date    WBC 14.5 02/23/2018    RBC 4.21 02/23/2018    RBC 4.32 09/15/2011    HGB 12.0 02/23/2018    HCT 36.3 02/23/2018     02/23/2018     09/15/2011    MCV 86.4 02/23/2018    MCH 28.6 02/23/2018    MCHC 33.1 02/23/2018    RDW 15.4 02/23/2018    LYMPHOPCT 13 02/23/2018    MONOPCT 8 02/23/2018    BASOPCT 0 02/23/2018    MONOSABS 1.20 02/23/2018    LYMPHSABS 1.90 02/23/2018    EOSABS 0.10 02/23/2018    BASOSABS 0.00 02/23/2018    DIFFTYPE NOT REPORTED 02/23/2018     CMP:    Lab Results   Component Value Date     02/23/2018    K 4.2 02/23/2018     02/23/2018    CO2 23 02/23/2018    BUN 8 02/23/2018    CREATININE 0.68 02/23/2018    GFRAA >60 02/23/2018    LABGLOM >60 02/23/2018    GLUCOSE 117 02/23/2018    GLUCOSE 89 09/15/2011    PROT 7.0 02/22/2018    LABALBU 4.1 02/22/2018    CALCIUM 8.5 02/23/2018    BILITOT 0.36 02/22/2018    ALKPHOS 68 02/22/2018    AST 12 02/22/2018    ALT 10 02/22/2018         ASSESSMENT     Principal Problem:    Diverticulitis large intestine w/o

## 2018-02-23 NOTE — FLOWSHEET NOTE
02/23/18 1645   Encounter Summary   Services provided to: Patient   Referral/Consult From: [facility]  (Son Nathan present)   Continue Visiting (2/23/18)   Complexity of Encounter Low   Length of Encounter 15 minutes   Spiritual Assessment Completed Yes   Routine   Type Initial   Assessment Calm; Approachable  (Feeling some better)   Intervention Active listening;Sustaining presence/ Ministry of presence   Outcome Acceptance

## 2018-02-23 NOTE — CONSULTS
Denies any chest pain, palpitations, claudication or edema  GI Denies any melena, hematochezia, hematemesis or pyrosis   Denies any frequency, urgency, hesitancy or incontinence  Heme Denies bruising or bleeding easily  Endocrine Denies any history of diabetes or thyroid disease  Neuro Denies any focal motor or sensory deficits    OBJECTIVE:   VITALS:  height is 5' 6\" (1.676 m) and weight is 174 lb 13.2 oz (79.3 kg). Her oral temperature is 98.2 °F (36.8 °C). Her blood pressure is 104/42 (abnormal) and her pulse is 78. Her respiration is 14 and oxygen saturation is 99%. CONSTITUTIONAL: Alert and oriented times 3, no acute distress and cooperative to examination with proper mood and affect. SKIN: Skin color, texture, turgor normal. No rashes or lesions. LYMPH: no cervical nodes, no inguinal nodes  HEENT: Head is normocephalic, atraumatic. EOMI, PERRLA  NECK: Supple, symmetrical, trachea midline, no adenopathy, thyroid symmetric, not enlarged and no tenderness, skin normal  CHEST/LUNGS: chest symmetric with normal A/P diameter, normal respiratory rate and rhythm, lungs clear to auscultation without wheezes, rales or rhonchi. No accessory muscle use. CARDIOVASCULAR: Heart regular rate and rhythm   ABDOMEN: Normal shape. Normal bowel sounds. No bruits. Soft, nondistended, no masses or organomegaly. no evidence of hernia. Percussion: Normal without hepatosplenomegally. Tenderness: LLQ with localized voluntary guarding  RECTAL: Deferred  NEUROLOGIC: There are no focalizing motor or sensory deficits. CN II-XII are grossly intact.   EXTREMITIES: no cyanosis, no clubbing and no edema    LABS:     CBC with Differential:    Lab Results   Component Value Date    WBC 14.1 02/22/2018    RBC 4.43 02/22/2018    RBC 4.32 09/15/2011    HGB 12.3 02/22/2018    HCT 38.2 02/22/2018     02/22/2018     09/15/2011    MCV 86.1 02/22/2018    MCH 27.8 02/22/2018    MCHC 32.3 02/22/2018    RDW 15.5 02/22/2018 LYMPHOPCT 17 02/22/2018    MONOPCT 10 02/22/2018    BASOPCT 1 02/22/2018    MONOSABS 1.40 02/22/2018    LYMPHSABS 2.40 02/22/2018    EOSABS 0.20 02/22/2018    BASOSABS 0.10 02/22/2018    DIFFTYPE NOT REPORTED 02/22/2018     CMP:    Lab Results   Component Value Date     02/22/2018    K 3.9 02/22/2018     02/22/2018    CO2 21 02/22/2018    BUN 14 02/22/2018    CREATININE 0.65 02/22/2018    GFRAA >60 02/22/2018    LABGLOM >60 02/22/2018    GLUCOSE 121 02/22/2018    GLUCOSE 89 09/15/2011    PROT 7.0 02/22/2018    LABALBU 4.1 02/22/2018    CALCIUM 8.7 02/22/2018    BILITOT 0.36 02/22/2018    ALKPHOS 68 02/22/2018    AST 12 02/22/2018    ALT 10 02/22/2018     Calcium:    Lab Results   Component Value Date    CALCIUM 8.7 02/22/2018     Magnesium:    Lab Results   Component Value Date    MG 2.0 02/22/2018     U/A:    Lab Results   Component Value Date    COLORU YELLOW 02/22/2018    PROTEINU NEGATIVE 02/22/2018    PHUR 5.5 02/22/2018    WBCUA 0 TO 2 02/22/2018    RBCUA 0 TO 2 02/22/2018    MUCUS NOT REPORTED 02/22/2018    TRICHOMONAS NOT REPORTED 02/22/2018    YEAST NOT REPORTED 02/22/2018    BACTERIA FEW 02/22/2018    SPECGRAV 1.011 02/22/2018    LEUKOCYTESUR NEGATIVE 02/22/2018    UROBILINOGEN Normal 02/22/2018    BILIRUBINUR NEGATIVE 02/22/2018    GLUCOSEU NEGATIVE 02/22/2018    AMORPHOUS NOT REPORTED 02/22/2018         RADIOLOGY:   I have personally reviewed the following films:  CT scan abd/pelvis:   Lower Chest: No significant abnormality at the lung bases. Organs: No focal liver lesion. There is cholelithiasis in an otherwise  unremarkable gallbladder. No biliary ductal dilatation. The spleen is  normal in size without focal lesion. The pancreas and the adrenal glands are  unremarkable. The kidneys enhance symmetrically without collecting system  dilatation. There is a 5 mm left intrarenal calculus.   Scattered bilateral  subcentimeter low-density renal lesions are too small to characterize,

## 2018-02-24 ENCOUNTER — APPOINTMENT (OUTPATIENT)
Dept: CT IMAGING | Age: 43
DRG: 392 | End: 2018-02-24

## 2018-02-24 PROCEDURE — 6360000002 HC RX W HCPCS: Performed by: SURGERY

## 2018-02-24 PROCEDURE — 2580000003 HC RX 258: Performed by: SURGERY

## 2018-02-24 PROCEDURE — 6370000000 HC RX 637 (ALT 250 FOR IP): Performed by: SURGERY

## 2018-02-24 PROCEDURE — 2580000003 HC RX 258: Performed by: INTERNAL MEDICINE

## 2018-02-24 PROCEDURE — 6360000002 HC RX W HCPCS: Performed by: STUDENT IN AN ORGANIZED HEALTH CARE EDUCATION/TRAINING PROGRAM

## 2018-02-24 PROCEDURE — 99232 SBSQ HOSP IP/OBS MODERATE 35: CPT | Performed by: INTERNAL MEDICINE

## 2018-02-24 PROCEDURE — 1200000000 HC SEMI PRIVATE

## 2018-02-24 PROCEDURE — 74177 CT ABD & PELVIS W/CONTRAST: CPT

## 2018-02-24 PROCEDURE — 2580000003 HC RX 258: Performed by: STUDENT IN AN ORGANIZED HEALTH CARE EDUCATION/TRAINING PROGRAM

## 2018-02-24 PROCEDURE — 6360000004 HC RX CONTRAST MEDICATION: Performed by: INTERNAL MEDICINE

## 2018-02-24 RX ORDER — 0.9 % SODIUM CHLORIDE 0.9 %
100 INTRAVENOUS SOLUTION INTRAVENOUS ONCE
Status: COMPLETED | OUTPATIENT
Start: 2018-02-24 | End: 2018-02-24

## 2018-02-24 RX ORDER — SODIUM CHLORIDE 0.9 % (FLUSH) 0.9 %
10 SYRINGE (ML) INJECTION PRN
Status: DISCONTINUED | OUTPATIENT
Start: 2018-02-24 | End: 2018-02-28

## 2018-02-24 RX ADMIN — MORPHINE SULFATE 4 MG: 4 INJECTION, SOLUTION INTRAMUSCULAR; INTRAVENOUS at 17:28

## 2018-02-24 RX ADMIN — Medication 10 ML: at 11:15

## 2018-02-24 RX ADMIN — PIPERACILLIN SODIUM,TAZOBACTAM SODIUM 3.38 G: 3; .375 INJECTION, POWDER, FOR SOLUTION INTRAVENOUS at 03:22

## 2018-02-24 RX ADMIN — Medication 1 CAPSULE: at 20:27

## 2018-02-24 RX ADMIN — SODIUM CHLORIDE 100 ML: 9 INJECTION, SOLUTION INTRAVENOUS at 11:15

## 2018-02-24 RX ADMIN — MORPHINE SULFATE 4 MG: 4 INJECTION, SOLUTION INTRAMUSCULAR; INTRAVENOUS at 03:11

## 2018-02-24 RX ADMIN — PIPERACILLIN SODIUM,TAZOBACTAM SODIUM 3.38 G: 3; .375 INJECTION, POWDER, FOR SOLUTION INTRAVENOUS at 11:24

## 2018-02-24 RX ADMIN — PIPERACILLIN SODIUM,TAZOBACTAM SODIUM 3.38 G: 3; .375 INJECTION, POWDER, FOR SOLUTION INTRAVENOUS at 20:22

## 2018-02-24 RX ADMIN — Medication 10 ML: at 07:35

## 2018-02-24 RX ADMIN — MORPHINE SULFATE 4 MG: 4 INJECTION, SOLUTION INTRAMUSCULAR; INTRAVENOUS at 10:21

## 2018-02-24 RX ADMIN — MORPHINE SULFATE 4 MG: 4 INJECTION, SOLUTION INTRAMUSCULAR; INTRAVENOUS at 07:34

## 2018-02-24 RX ADMIN — IOPAMIDOL 100 ML: 755 INJECTION, SOLUTION INTRAVENOUS at 11:14

## 2018-02-24 RX ADMIN — Medication 1 CAPSULE: at 07:35

## 2018-02-24 RX ADMIN — ENOXAPARIN SODIUM 40 MG: 40 INJECTION SUBCUTANEOUS at 07:34

## 2018-02-24 ASSESSMENT — PAIN SCALES - GENERAL
PAINLEVEL_OUTOF10: 3
PAINLEVEL_OUTOF10: 5
PAINLEVEL_OUTOF10: 6
PAINLEVEL_OUTOF10: 7
PAINLEVEL_OUTOF10: 5
PAINLEVEL_OUTOF10: 3
PAINLEVEL_OUTOF10: 4
PAINLEVEL_OUTOF10: 5

## 2018-02-24 ASSESSMENT — PAIN DESCRIPTION - PAIN TYPE
TYPE: ACUTE PAIN

## 2018-02-24 ASSESSMENT — PAIN DESCRIPTION - LOCATION: LOCATION: ABDOMEN

## 2018-02-24 NOTE — CARE COORDINATION
ONGOING DISCHARGE PLAN:    Spoke with patient regarding discharge plan and patient confirms that plan is to return home  Here with diverticulitis, still NPO, abd tender, mildly bloated, bs hypoactive  On IV zosyn and fluids at 125 ml/hr    Will continue to follow for additional discharge needs.     Electronically signed by Mahamed Oh RN on 2/24/2018 at 1:49 PM

## 2018-02-24 NOTE — PROGRESS NOTES
in the last 72 hours. S. Magnesium:  Recent Labs      02/22/18   0820   MG  2.0     S. Phosphorus:No results for input(s): PHOS in the last 72 hours. S. Glucose:No results for input(s): POCGLU in the last 72 hours. Glycosylated hemoglobin A1C: No results for input(s): LABA1C in the last 72 hours. INR: No results for input(s): INR in the last 72 hours. Hepatic functions:   Recent Labs      02/22/18   0820   ALKPHOS  68   ALT  10   AST  12   PROT  7.0   BILITOT  0.36   LABALBU  4.1     Pancreatic functions:No results for input(s): LACTA, AMYLASE in the last 72 hours. S. Lactic Acid: No results for input(s): LACTA in the last 72 hours. Cardiac enzymes:No results for input(s): CKTOTAL, CKMB, CKMBINDEX, TROPONINI in the last 72 hours. BNP:No results for input(s): BNP in the last 72 hours. Lipid profile: No results for input(s): CHOL, TRIG, HDL, LDLCALC in the last 72 hours. Invalid input(s): LDL  Blood Gases: No results found for: PH, PCO2, PO2, HCO3, O2SAT  Thyroid functions: No results found for: TSH     Imaging/Diagonstics:  EKG: Normal sinus rhythm    CXR: No acute cardiopulmonary findings. ASSESSMENT:    Patient Active Problem List   Diagnosis    Diverticulitis large intestine w/o perforation or abscess w/bleeding    Hx of diverticulitis of colon      ct  Pend   PLAN:   cont iv zosyn   repeat ct   wbc 14   ivf    surgery seeing       MD GERMAN Nicholson[address], [address] [place] [address].    Phone [phone]   Fax: [phone]  Answering Service: [phone]

## 2018-02-24 NOTE — PLAN OF CARE
Problem: Fluid Volume - Imbalance:  Goal: Absence of imbalanced fluid volume signs and symptoms  Absence of imbalanced fluid volume signs and symptoms   Outcome: Ongoing      Problem: Pain:  Goal: Pain level will decrease  Pain level will decrease   Outcome: Ongoing  Adequate pain control achieved this shift. See MAR.

## 2018-02-25 LAB
ABSOLUTE EOS #: 0.2 K/UL (ref 0–0.4)
ABSOLUTE IMMATURE GRANULOCYTE: ABNORMAL K/UL (ref 0–0.3)
ABSOLUTE LYMPH #: 2.4 K/UL (ref 1–4.8)
ABSOLUTE MONO #: 0.7 K/UL (ref 0.1–1.3)
ANION GAP SERPL CALCULATED.3IONS-SCNC: 13 MMOL/L (ref 9–17)
BASOPHILS # BLD: 1 % (ref 0–2)
BASOPHILS ABSOLUTE: 0 K/UL (ref 0–0.2)
BUN BLDV-MCNC: 11 MG/DL (ref 6–20)
BUN/CREAT BLD: ABNORMAL (ref 9–20)
CALCIUM SERPL-MCNC: 8.3 MG/DL (ref 8.6–10.4)
CHLORIDE BLD-SCNC: 103 MMOL/L (ref 98–107)
CO2: 20 MMOL/L (ref 20–31)
CREAT SERPL-MCNC: 0.61 MG/DL (ref 0.5–0.9)
DIFFERENTIAL TYPE: ABNORMAL
EOSINOPHILS RELATIVE PERCENT: 3 % (ref 0–4)
GFR AFRICAN AMERICAN: >60 ML/MIN
GFR NON-AFRICAN AMERICAN: >60 ML/MIN
GFR SERPL CREATININE-BSD FRML MDRD: ABNORMAL ML/MIN/{1.73_M2}
GFR SERPL CREATININE-BSD FRML MDRD: ABNORMAL ML/MIN/{1.73_M2}
GLUCOSE BLD-MCNC: 80 MG/DL (ref 70–99)
HCT VFR BLD CALC: 34.7 % (ref 36–46)
HEMOGLOBIN: 11.2 G/DL (ref 12–16)
IMMATURE GRANULOCYTES: ABNORMAL %
LYMPHOCYTES # BLD: 28 % (ref 24–44)
MCH RBC QN AUTO: 28 PG (ref 26–34)
MCHC RBC AUTO-ENTMCNC: 32.3 G/DL (ref 31–37)
MCV RBC AUTO: 86.5 FL (ref 80–100)
MONOCYTES # BLD: 8 % (ref 1–7)
NRBC AUTOMATED: ABNORMAL PER 100 WBC
PDW BLD-RTO: 15.4 % (ref 11.5–14.9)
PLATELET # BLD: 270 K/UL (ref 150–450)
PLATELET ESTIMATE: ABNORMAL
PMV BLD AUTO: 8.5 FL (ref 6–12)
POTASSIUM SERPL-SCNC: 4 MMOL/L (ref 3.7–5.3)
RBC # BLD: 4.01 M/UL (ref 4–5.2)
RBC # BLD: ABNORMAL 10*6/UL
SEG NEUTROPHILS: 60 % (ref 36–66)
SEGMENTED NEUTROPHILS ABSOLUTE COUNT: 5.4 K/UL (ref 1.3–9.1)
SODIUM BLD-SCNC: 136 MMOL/L (ref 135–144)
WBC # BLD: 8.8 K/UL (ref 3.5–11)
WBC # BLD: ABNORMAL 10*3/UL

## 2018-02-25 PROCEDURE — 6360000002 HC RX W HCPCS: Performed by: SURGERY

## 2018-02-25 PROCEDURE — 6360000002 HC RX W HCPCS: Performed by: STUDENT IN AN ORGANIZED HEALTH CARE EDUCATION/TRAINING PROGRAM

## 2018-02-25 PROCEDURE — 99233 SBSQ HOSP IP/OBS HIGH 50: CPT | Performed by: INTERNAL MEDICINE

## 2018-02-25 PROCEDURE — 85025 COMPLETE CBC W/AUTO DIFF WBC: CPT

## 2018-02-25 PROCEDURE — 6370000000 HC RX 637 (ALT 250 FOR IP): Performed by: SURGERY

## 2018-02-25 PROCEDURE — 1200000000 HC SEMI PRIVATE

## 2018-02-25 PROCEDURE — 80048 BASIC METABOLIC PNL TOTAL CA: CPT

## 2018-02-25 PROCEDURE — 2580000003 HC RX 258: Performed by: SURGERY

## 2018-02-25 PROCEDURE — 36415 COLL VENOUS BLD VENIPUNCTURE: CPT

## 2018-02-25 RX ADMIN — SODIUM CHLORIDE: 9 INJECTION, SOLUTION INTRAVENOUS at 17:13

## 2018-02-25 RX ADMIN — PIPERACILLIN SODIUM,TAZOBACTAM SODIUM 3.38 G: 3; .375 INJECTION, POWDER, FOR SOLUTION INTRAVENOUS at 02:55

## 2018-02-25 RX ADMIN — Medication 1 CAPSULE: at 09:54

## 2018-02-25 RX ADMIN — MORPHINE SULFATE 4 MG: 4 INJECTION, SOLUTION INTRAMUSCULAR; INTRAVENOUS at 20:23

## 2018-02-25 RX ADMIN — Medication 1 CAPSULE: at 20:20

## 2018-02-25 RX ADMIN — PIPERACILLIN SODIUM,TAZOBACTAM SODIUM 3.38 G: 3; .375 INJECTION, POWDER, FOR SOLUTION INTRAVENOUS at 11:37

## 2018-02-25 RX ADMIN — ENOXAPARIN SODIUM 40 MG: 40 INJECTION SUBCUTANEOUS at 09:54

## 2018-02-25 RX ADMIN — MORPHINE SULFATE 4 MG: 4 INJECTION, SOLUTION INTRAMUSCULAR; INTRAVENOUS at 17:53

## 2018-02-25 RX ADMIN — MORPHINE SULFATE 4 MG: 4 INJECTION, SOLUTION INTRAMUSCULAR; INTRAVENOUS at 09:55

## 2018-02-25 RX ADMIN — ONDANSETRON 4 MG: 2 INJECTION INTRAMUSCULAR; INTRAVENOUS at 17:58

## 2018-02-25 ASSESSMENT — PAIN DESCRIPTION - PAIN TYPE
TYPE: ACUTE PAIN

## 2018-02-25 ASSESSMENT — PAIN SCALES - GENERAL
PAINLEVEL_OUTOF10: 2
PAINLEVEL_OUTOF10: 3
PAINLEVEL_OUTOF10: 4
PAINLEVEL_OUTOF10: 8
PAINLEVEL_OUTOF10: 6
PAINLEVEL_OUTOF10: 6
PAINLEVEL_OUTOF10: 2
PAINLEVEL_OUTOF10: 1
PAINLEVEL_OUTOF10: 1
PAINLEVEL_OUTOF10: 2
PAINLEVEL_OUTOF10: 6

## 2018-02-25 ASSESSMENT — PAIN DESCRIPTION - LOCATION: LOCATION: ABDOMEN

## 2018-02-25 NOTE — PROGRESS NOTES
Ionized Calcium:No results for input(s): IONCA in the last 72 hours. S. Magnesium:  No results for input(s): MG in the last 72 hours. S. Phosphorus:No results for input(s): PHOS in the last 72 hours. S. Glucose:No results for input(s): POCGLU in the last 72 hours. Glycosylated hemoglobin A1C: No results for input(s): LABA1C in the last 72 hours. INR: No results for input(s): INR in the last 72 hours. Hepatic functions:   No results for input(s): ALKPHOS, ALT, AST, PROT, BILITOT, BILIDIR, LABALBU in the last 72 hours. Pancreatic functions:No results for input(s): LACTA, AMYLASE in the last 72 hours. S. Lactic Acid: No results for input(s): LACTA in the last 72 hours. Cardiac enzymes:No results for input(s): CKTOTAL, CKMB, CKMBINDEX, TROPONINI in the last 72 hours. BNP:No results for input(s): BNP in the last 72 hours. Lipid profile: No results for input(s): CHOL, TRIG, HDL, LDLCALC in the last 72 hours. Invalid input(s): LDL  Blood Gases: No results found for: PH, PCO2, PO2, HCO3, O2SAT  Thyroid functions: No results found for: TSH     Imaging/Diagonstics:  EKG: Normal sinus rhythm    CXR: No acute cardiopulmonary findings. ASSESSMENT:    Patient Active Problem List   Diagnosis    Diverticulitis large intestine w/o perforation or abscess w/bleeding    Hx of diverticulitis of colon      ct  Pend   PLAN:   cont iv zosyn   repeat ct   wbc 14   ivf    surgery seeing        2/25     diverticuklitis with small abscess     cont iv atb    Surgery to decide if  laparatomy is needed vs ir drainage     wbc nl     Cont ivf   pain control   ;ytes nl     MD GERMAN Freitas[address].    Phone [phone]   Fax: [phone]  Answering Service: [phone]

## 2018-02-26 PROCEDURE — 1200000000 HC SEMI PRIVATE

## 2018-02-26 PROCEDURE — 99232 SBSQ HOSP IP/OBS MODERATE 35: CPT | Performed by: INTERNAL MEDICINE

## 2018-02-26 PROCEDURE — 99254 IP/OBS CNSLTJ NEW/EST MOD 60: CPT | Performed by: INTERNAL MEDICINE

## 2018-02-26 PROCEDURE — 6360000002 HC RX W HCPCS: Performed by: STUDENT IN AN ORGANIZED HEALTH CARE EDUCATION/TRAINING PROGRAM

## 2018-02-26 PROCEDURE — 6360000002 HC RX W HCPCS: Performed by: SURGERY

## 2018-02-26 PROCEDURE — 6370000000 HC RX 637 (ALT 250 FOR IP): Performed by: SURGERY

## 2018-02-26 PROCEDURE — 2580000003 HC RX 258: Performed by: SURGERY

## 2018-02-26 RX ADMIN — Medication 1 CAPSULE: at 22:15

## 2018-02-26 RX ADMIN — PIPERACILLIN SODIUM,TAZOBACTAM SODIUM 3.38 G: 3; .375 INJECTION, POWDER, FOR SOLUTION INTRAVENOUS at 11:06

## 2018-02-26 RX ADMIN — SODIUM CHLORIDE: 9 INJECTION, SOLUTION INTRAVENOUS at 15:11

## 2018-02-26 RX ADMIN — MORPHINE SULFATE 4 MG: 4 INJECTION, SOLUTION INTRAMUSCULAR; INTRAVENOUS at 08:46

## 2018-02-26 RX ADMIN — MORPHINE SULFATE 4 MG: 4 INJECTION, SOLUTION INTRAMUSCULAR; INTRAVENOUS at 04:14

## 2018-02-26 RX ADMIN — SODIUM CHLORIDE: 9 INJECTION, SOLUTION INTRAVENOUS at 03:05

## 2018-02-26 RX ADMIN — PIPERACILLIN SODIUM,TAZOBACTAM SODIUM 3.38 G: 3; .375 INJECTION, POWDER, FOR SOLUTION INTRAVENOUS at 18:45

## 2018-02-26 RX ADMIN — ENOXAPARIN SODIUM 40 MG: 40 INJECTION SUBCUTANEOUS at 08:46

## 2018-02-26 RX ADMIN — Medication 1 CAPSULE: at 08:46

## 2018-02-26 ASSESSMENT — PAIN SCALES - GENERAL
PAINLEVEL_OUTOF10: 7
PAINLEVEL_OUTOF10: 5
PAINLEVEL_OUTOF10: 0
PAINLEVEL_OUTOF10: 4

## 2018-02-26 ASSESSMENT — PAIN DESCRIPTION - PAIN TYPE: TYPE: ACUTE PAIN

## 2018-02-26 ASSESSMENT — PAIN DESCRIPTION - LOCATION: LOCATION: ABDOMEN

## 2018-02-26 NOTE — CARE COORDINATION
ONGOING DISCHARGE PLAN:    Spoke with patient regarding discharge plan and patient confirms that plan is still to go home & denies, VNS. Pt. States she did speak to [employer] from [employer]. Will continue to follow for possible med assist, for Pt. Is self pay. Pt. Remains NPO, & on IV Zosyn. Surgery continues to follow, CT showed possible Abscess formation. Will continue to follow for additional discharge needs.     Electronically signed by Macrina De La Paz RN on 2/26/2018 at 9:03 AM

## 2018-02-26 NOTE — PROGRESS NOTES
becoming more apparent suggestive of small abscess along posterior aspect of the inflamed sigmoid colon. 2. Cholelithiasis. ASSESSMENT     Principal Problem:    Diverticulitis large intestine w/o perforation or abscess w/bleeding  Active Problems:    Hx of diverticulitis of colon  Resolved Problems:    * No resolved hospital problems. *  recurrent sigmoid diverticulitis with small abscess, likely intramural    Plan  1. Continue antibiotics  2.  Discussed surgical intervention if status worsens

## 2018-02-26 NOTE — PROGRESS NOTES
[facility]        Patient name:  Mary Linares  Date of admission:  2/22/2018  7:30 AM  MRN:   [id number]  YOB: 1975    CC- diverticulitis     HPI-    Pt with abdominal pain   Due to diverticulitis       CT showed   Worsening pelvic infiltrative changes associated with the acute sigmoid   diverticulitis.  Additionally a 12 mm rim enhancing hypodensity is now   becoming more apparent suggestive of small abscess along posterior aspect of   the inflamed sigmoid colon. 2. Cholelithiasis. REVIEW OF SYSTEMS:    · General----negative for fatigue, weight loss  · GI negative for nausea and vomiting, no dysphagia       EXAM-  BP (!) 103/50   Pulse 60   Temp 98.2 °F (36.8 °C) (Oral)   Resp 16   Ht 5' 6\" (1.676 m)   Wt 174 lb 13.2 oz (79.3 kg)   SpO2 99%   BMI 28.22 kg/m²      · General appearance: NAD conversant  · Lungs: normal effort, clear to auscultation bilaterally,no wheeze. · Heart: regular rate and rhythm, S1, S2 normal, no murmur  · Abdomen: soft, LLQ tender  no organomegaly  · Extremities: no cyanosis, no edema no clubbing no synovitis      Laboratory Testing:  CBC:   Recent Labs      02/25/18   0858   WBC  8.8   HGB  11.2*   PLT  270     BMP:    Recent Labs      02/25/18   0858   NA  136   K  4.0   CL  103   CO2  20   BUN  11   CREATININE  0.61   GLUCOSE  80         ASSESSMENT:    Patient Active Problem List   Diagnosis    Diverticulitis large intestine w/o perforation or abscess w/bleeding    Hx of diverticulitis of colon       PLAN:  Antibiotics changed to zosyn   ID consulted       MD GERMAN Torres[address].    Phone [phone]   Fax: [phone]  Answering Service: [phone]

## 2018-02-26 NOTE — CONSULTS
myalgia, arthralgia. Denies focal weakness,   Other than above 10 systems reviewed were negative . Tolerating antibiotics. Physical Exam  BP (!) 111/58   Pulse 58   Temp 98.1 °F (36.7 °C) (Oral)   Resp 18   Ht 5' 6\" (1.676 m)   Wt 174 lb 13.2 oz (79.3 kg)   SpO2 100%   BMI 28.22 kg/m²           General Appearance: alert and oriented to person, place and time, well-developed and well-nourished, in no acute distress  Skin: warm and dry, no rash or erythema  Head: normocephalic and atraumatic  Eyes: pupils equal, round, and reactive to light, extraocular eye movements intact, conjunctivae normal  ENT: hearing grossly normal bilaterally  Neck: neck supple and non tender without mass, no thyromegaly or thyroid nodules, no cervical lymphadenopathy   Pulmonary/Chest: clear to auscultation bilaterally- no wheezes, rales or rhonchi, normal air movement, no respiratory distress  Cardiovascular: normal rate, regular rhythm, normal S1 and S2, no murmurs, rubs, clicks or gallops, distal pulses intact, no carotid bruits  Abdomen: soft, LLQ-tenderness , non-distended, normal bowel sounds, no masses or organomegaly  Extremities: no cyanosis, clubbing or edema  Musculoskeletal: normal range of motion, no joint swelling, deformity or tenderness  Neurologic: no cranial nerve deficit and muscle strength normal    Data Review:    Recent Labs      02/25/18   0858   WBC  8.8   HGB  11.2*   HCT  34.7*   MCV  86.5   PLT  270     Recent Labs      02/25/18   0858   NA  136   K  4.0   CL  103   CO2  20   BUN  11   CREATININE  0.61       Imaging Studies:                           All appropriate imaging studies and reports reviewed: Yes                 Assessment:     Principal Problem:    Diverticulitis large intestine with possible small abscess         Recommendations:   Cont IV Zosyn for now . Will D/W surgery further treatment plan . Likely PO ABXS on discharge .       Thank you for allowing me to participate in the

## 2018-02-26 NOTE — PLAN OF CARE
Problem: Pain:  Goal: Pain level will decrease  Pain level will decrease   Outcome: Ongoing  Pain assessed and charted medicated as ordered

## 2018-02-27 LAB
ABSOLUTE EOS #: 0.2 K/UL (ref 0–0.4)
ABSOLUTE IMMATURE GRANULOCYTE: ABNORMAL K/UL (ref 0–0.3)
ABSOLUTE LYMPH #: 2.1 K/UL (ref 1–4.8)
ABSOLUTE MONO #: 0.6 K/UL (ref 0.1–1.3)
ANION GAP SERPL CALCULATED.3IONS-SCNC: 12 MMOL/L (ref 9–17)
BASOPHILS # BLD: 1 % (ref 0–2)
BASOPHILS ABSOLUTE: 0.1 K/UL (ref 0–0.2)
BUN BLDV-MCNC: 6 MG/DL (ref 6–20)
BUN/CREAT BLD: ABNORMAL (ref 9–20)
CALCIUM SERPL-MCNC: 9.5 MG/DL (ref 8.6–10.4)
CHLORIDE BLD-SCNC: 103 MMOL/L (ref 98–107)
CO2: 24 MMOL/L (ref 20–31)
CREAT SERPL-MCNC: 0.66 MG/DL (ref 0.5–0.9)
DIFFERENTIAL TYPE: ABNORMAL
EOSINOPHILS RELATIVE PERCENT: 3 % (ref 0–4)
GFR AFRICAN AMERICAN: >60 ML/MIN
GFR NON-AFRICAN AMERICAN: >60 ML/MIN
GFR SERPL CREATININE-BSD FRML MDRD: ABNORMAL ML/MIN/{1.73_M2}
GFR SERPL CREATININE-BSD FRML MDRD: ABNORMAL ML/MIN/{1.73_M2}
GLUCOSE BLD-MCNC: 113 MG/DL (ref 70–99)
HCG QUANTITATIVE: <1 IU/L
HCT VFR BLD CALC: 39.4 % (ref 36–46)
HEMOGLOBIN: 13 G/DL (ref 12–16)
IMMATURE GRANULOCYTES: ABNORMAL %
LYMPHOCYTES # BLD: 30 % (ref 24–44)
MCH RBC QN AUTO: 28.1 PG (ref 26–34)
MCHC RBC AUTO-ENTMCNC: 32.9 G/DL (ref 31–37)
MCV RBC AUTO: 85.4 FL (ref 80–100)
MONOCYTES # BLD: 9 % (ref 1–7)
NRBC AUTOMATED: ABNORMAL PER 100 WBC
PDW BLD-RTO: 15 % (ref 11.5–14.9)
PLATELET # BLD: 357 K/UL (ref 150–450)
PLATELET ESTIMATE: ABNORMAL
PMV BLD AUTO: 8.3 FL (ref 6–12)
POTASSIUM SERPL-SCNC: 4.2 MMOL/L (ref 3.7–5.3)
RBC # BLD: 4.62 M/UL (ref 4–5.2)
RBC # BLD: ABNORMAL 10*6/UL
SEG NEUTROPHILS: 57 % (ref 36–66)
SEGMENTED NEUTROPHILS ABSOLUTE COUNT: 4 K/UL (ref 1.3–9.1)
SODIUM BLD-SCNC: 139 MMOL/L (ref 135–144)
WBC # BLD: 7 K/UL (ref 3.5–11)
WBC # BLD: ABNORMAL 10*3/UL

## 2018-02-27 PROCEDURE — 2580000003 HC RX 258: Performed by: SURGERY

## 2018-02-27 PROCEDURE — 85025 COMPLETE CBC W/AUTO DIFF WBC: CPT

## 2018-02-27 PROCEDURE — 80048 BASIC METABOLIC PNL TOTAL CA: CPT

## 2018-02-27 PROCEDURE — 36415 COLL VENOUS BLD VENIPUNCTURE: CPT

## 2018-02-27 PROCEDURE — 6360000002 HC RX W HCPCS: Performed by: SURGERY

## 2018-02-27 PROCEDURE — 84702 CHORIONIC GONADOTROPIN TEST: CPT

## 2018-02-27 PROCEDURE — 1200000000 HC SEMI PRIVATE

## 2018-02-27 PROCEDURE — 6370000000 HC RX 637 (ALT 250 FOR IP): Performed by: SURGERY

## 2018-02-27 PROCEDURE — 6360000002 HC RX W HCPCS: Performed by: STUDENT IN AN ORGANIZED HEALTH CARE EDUCATION/TRAINING PROGRAM

## 2018-02-27 PROCEDURE — 99232 SBSQ HOSP IP/OBS MODERATE 35: CPT | Performed by: INTERNAL MEDICINE

## 2018-02-27 PROCEDURE — 99233 SBSQ HOSP IP/OBS HIGH 50: CPT | Performed by: INTERNAL MEDICINE

## 2018-02-27 RX ADMIN — SODIUM CHLORIDE: 9 INJECTION, SOLUTION INTRAVENOUS at 17:57

## 2018-02-27 RX ADMIN — ENOXAPARIN SODIUM 40 MG: 40 INJECTION SUBCUTANEOUS at 08:18

## 2018-02-27 RX ADMIN — PIPERACILLIN SODIUM,TAZOBACTAM SODIUM 3.38 G: 3; .375 INJECTION, POWDER, FOR SOLUTION INTRAVENOUS at 18:30

## 2018-02-27 RX ADMIN — Medication 1 CAPSULE: at 21:03

## 2018-02-27 RX ADMIN — PIPERACILLIN SODIUM,TAZOBACTAM SODIUM 3.38 G: 3; .375 INJECTION, POWDER, FOR SOLUTION INTRAVENOUS at 02:22

## 2018-02-27 RX ADMIN — Medication 1 CAPSULE: at 08:18

## 2018-02-27 RX ADMIN — PIPERACILLIN SODIUM,TAZOBACTAM SODIUM 3.38 G: 3; .375 INJECTION, POWDER, FOR SOLUTION INTRAVENOUS at 11:07

## 2018-02-27 ASSESSMENT — PAIN SCALES - GENERAL
PAINLEVEL_OUTOF10: 2
PAINLEVEL_OUTOF10: 0

## 2018-02-27 NOTE — PLAN OF CARE
Problem: Fluid Volume - Imbalance:  Goal: Absence of imbalanced fluid volume signs and symptoms  Absence of imbalanced fluid volume signs and symptoms   Outcome: Ongoing  I&O's are as charted this shift. Continuing to monitor    Problem: Pain:  Goal: Control of acute pain  Control of acute pain   Outcome: Met This Shift  Adequate pain control achieved this shift. See MAR.

## 2018-02-27 NOTE — PROGRESS NOTES
Infectious disease Consult Note      Patient: Selina Cates  : 1975  Acct#:  [id number]     Date:  2018    Subjective:       History of Present Illness  Patient is a 43 y.o.  female admitted with Diverticulitis large intestine w/o perforation or abscess w/bleeding [K57.33] who is seen in consult for the same . She presented with left lower abdominal pain worsening over several days associated with constipation. She denied fever or chills ,denied N/V/D .  CT abdomen  suggestive of acute diverticulitis that is worse on F/U CT on  with possible small abscess . She had H/O diverticulitis several years ago . no F/U colonoscopy was done . Today   She denied abdominal pain today  ,no new complaints . Tolerating diet . Past Medical History:   Diagnosis Date    Cancer [facility])     CERVIX    GERD (gastroesophageal reflux disease)     Diveritvulitis     Wears glasses     Wears partial dentures     UPPER      Past Surgical History:   Procedure Laterality Date    CARPAL TUNNEL RELEASE Left 14    CERVIX SURGERY      CANCER REMOVED FROM CERVIX          Admission Meds  No current facility-administered medications on file prior to encounter. Current Outpatient Prescriptions on File Prior to Encounter   Medication Sig Dispense Refill    ibuprofen (ADVIL;MOTRIN) 800 MG tablet Take 800 mg by mouth 2 times daily as needed for Pain. Allergies  No Known Allergies     Social   Social History   Substance Use Topics    Smoking status: Current Every Day Smoker     Packs/day: 0.75    Smokeless tobacco: Never Used    Alcohol use Yes      Comment: OCC             Family History   Problem Relation Age of Onset    Arthritis Mother     High Blood Pressure Mother           Review of Systems  No fever / chills / sweats. Denies cough / sputum. Denies chest pain, palpitations  Denies dysuria or change in urinary function. Denies joint swelling or pain.   No myalgia,

## 2018-02-28 VITALS
SYSTOLIC BLOOD PRESSURE: 121 MMHG | BODY MASS INDEX: 28.1 KG/M2 | OXYGEN SATURATION: 99 % | DIASTOLIC BLOOD PRESSURE: 59 MMHG | TEMPERATURE: 97.4 F | HEART RATE: 58 BPM | RESPIRATION RATE: 14 BRPM | WEIGHT: 174.82 LBS | HEIGHT: 66 IN

## 2018-02-28 PROCEDURE — 6360000002 HC RX W HCPCS: Performed by: SURGERY

## 2018-02-28 PROCEDURE — 2580000003 HC RX 258: Performed by: SURGERY

## 2018-02-28 PROCEDURE — 6370000000 HC RX 637 (ALT 250 FOR IP): Performed by: SURGERY

## 2018-02-28 PROCEDURE — 99239 HOSP IP/OBS DSCHRG MGMT >30: CPT | Performed by: INTERNAL MEDICINE

## 2018-02-28 RX ORDER — METRONIDAZOLE 500 MG/1
500 TABLET ORAL 3 TIMES DAILY
Qty: 21 TABLET | Refills: 0 | Status: SHIPPED | OUTPATIENT
Start: 2018-02-28 | End: 2018-02-28 | Stop reason: HOSPADM

## 2018-02-28 RX ORDER — CIPROFLOXACIN 500 MG/1
500 TABLET, FILM COATED ORAL 2 TIMES DAILY
Qty: 14 TABLET | Refills: 0 | Status: SHIPPED | OUTPATIENT
Start: 2018-02-28 | End: 2018-02-28 | Stop reason: HOSPADM

## 2018-02-28 RX ORDER — AMOXICILLIN AND CLAVULANATE POTASSIUM 875; 125 MG/1; MG/1
1 TABLET, FILM COATED ORAL 2 TIMES DAILY
Qty: 20 TABLET | Refills: 0 | Status: SHIPPED | OUTPATIENT
Start: 2018-02-28 | End: 2018-03-10

## 2018-02-28 RX ADMIN — Medication 1 CAPSULE: at 09:46

## 2018-02-28 RX ADMIN — PIPERACILLIN SODIUM,TAZOBACTAM SODIUM 3.38 G: 3; .375 INJECTION, POWDER, FOR SOLUTION INTRAVENOUS at 01:34

## 2018-02-28 NOTE — PROGRESS NOTES
General Surgery Progress Note            PATIENT NAME: Hari Records     TODAY'S DATE: 2/28/2018, 7:30 AM    SUBJECTIVE:    Pt  Seen and examined. Doing better. OBJECTIVE:   VITALS:  BP (!) 107/42   Pulse 59   Temp 98.2 °F (36.8 °C) (Oral)   Resp 14   Ht 5' 6\" (1.676 m)   Wt 174 lb 13.2 oz (79.3 kg)   SpO2 99%   BMI 28.22 kg/m²      INTAKE/OUTPUT:      Intake/Output Summary (Last 24 hours) at 02/28/18 0730  Last data filed at 02/28/18 0541   Gross per 24 hour   Intake             1180 ml   Output                0 ml   Net             1180 ml                 CONSTITUTIONAL:  awake and alert. no apparent distress  HEART:   Regular   LUNGS:   Clear   ABDOMEN:   Abdomen soft, non-tender, non-distended.  Tolerated low fiber diet  EXTREMITIES:   No edema    Data:  CBC with Differential:    Lab Results   Component Value Date    WBC 7.0 02/27/2018    RBC 4.62 02/27/2018    RBC 4.32 09/15/2011    HGB 13.0 02/27/2018    HCT 39.4 02/27/2018     02/27/2018     09/15/2011    MCV 85.4 02/27/2018    MCH 28.1 02/27/2018    MCHC 32.9 02/27/2018    RDW 15.0 02/27/2018    LYMPHOPCT 30 02/27/2018    MONOPCT 9 02/27/2018    BASOPCT 1 02/27/2018    MONOSABS 0.60 02/27/2018    LYMPHSABS 2.10 02/27/2018    EOSABS 0.20 02/27/2018    BASOSABS 0.10 02/27/2018    DIFFTYPE NOT REPORTED 02/27/2018     CMP:    Lab Results   Component Value Date     02/27/2018    K 4.2 02/27/2018     02/27/2018    CO2 24 02/27/2018    BUN 6 02/27/2018    CREATININE 0.66 02/27/2018    GFRAA >60 02/27/2018    LABGLOM >60 02/27/2018    GLUCOSE 113 02/27/2018    GLUCOSE 89 09/15/2011    PROT 7.0 02/22/2018    LABALBU 4.1 02/22/2018    CALCIUM 9.5 02/27/2018    BILITOT 0.36 02/22/2018    ALKPHOS 68 02/22/2018    AST 12 02/22/2018    ALT 10 02/22/2018         ASSESSMENT     Principal Problem:    Diverticulitis large intestine w/o perforation or abscess w/bleeding  Active Problems:    Hx of diverticulitis of colon    Lower abdominal pain    Acute diverticulitis  Resolved Problems:    * No resolved hospital problems. *  recurrent diverticulitis    Plan  1. OK to ME home  2. Outpatient follow up  3.  Will need interval colonoscopy/sigmoid resection

## 2018-02-28 NOTE — PROGRESS NOTES
Infectious disease Consult Note      Patient: David Recinos  : 1975  Acct#:  [id number]     Date:  2018    Subjective:       History of Present Illness  Patient is a 43 y.o.  female admitted with Diverticulitis large intestine w/o perforation or abscess w/bleeding [K57.33] who is seen in consult for the same . She presented with left lower abdominal pain worsening over several days associated with constipation. She denied fever or chills ,denied N/V/D .  CT abdomen  suggestive of acute diverticulitis that is worse on F/U CT on  with possible small abscess . She had H/O diverticulitis several years ago . no F/U colonoscopy was done . Today   She denied abdominal pain today  ,no new complaints . Tolerating diet . Past Medical History:   Diagnosis Date    Cancer [facility])     CERVIX    GERD (gastroesophageal reflux disease)     Diveritvulitis     Wears glasses     Wears partial dentures     UPPER      Past Surgical History:   Procedure Laterality Date    CARPAL TUNNEL RELEASE Left 14    CERVIX SURGERY      CANCER REMOVED FROM CERVIX          Admission Meds  No current facility-administered medications on file prior to encounter. No current outpatient prescriptions on file prior to encounter. Allergies  No Known Allergies     Social   Social History   Substance Use Topics    Smoking status: Current Every Day Smoker     Packs/day: 0.75    Smokeless tobacco: Never Used    Alcohol use Yes      Comment: OCC             Family History   Problem Relation Age of Onset    Arthritis Mother     High Blood Pressure Mother           Review of Systems  No fever / chills / sweats. Denies cough / sputum. Denies chest pain, palpitations  Denies dysuria or change in urinary function. Denies joint swelling or pain. No myalgia, arthralgia. Denies focal weakness,   Other than above 10 systems reviewed were negative . Tolerating antibiotics.

## 2018-02-28 NOTE — DISCHARGE SUMMARY
Internal Medicine   Discharge Summary         Patient Identification:  Deana Huston is a 43 y.o. female. :  1975  MRN: [id number]     Acct: [de-identified]   Admit Date:  2018  Discharge date and time: No discharge date for patient encounter. Attending Provider: Panda De La Cruz MD                                       Reason for Admission:   Diverticulitis large intestine w/o perforation or abscess w/bleeding     Hx of diverticulitis of colon    Lower abdominal pain    Acute diverticulitis           Discharge Diagnoses:   Patient Active Problem List   Diagnosis    Diverticulitis large intestine w/o perforation or abscess w/bleeding    Hx of diverticulitis of colon    Lower abdominal pain    Acute diverticulitis          Consults:  Sx ID     Procedures:    . Worsening pelvic infiltrative changes associated with the acute sigmoid   diverticulitis.  Additionally a 12 mm rim enhancing hypodensity is now   becoming more apparent suggestive of small abscess along posterior aspect of   the inflamed sigmoid colon. 2. Cholelithiasis. Hospital Course:     Diverticulitis large intestine w/o perforation or abscess w/bleeding     Hx of diverticulitis of colon    Lower abdominal pain    Acute diverticulitis       dcon cipro flagyl     Disposition:   Home    Discharged Condition:  Stable     Discharge Medications:       [place], [address] Medication Instructions [id number]    Printed on:18 1128   Medication Information                      ciprofloxacin (CIPRO) 500 MG tablet  Take 1 tablet by mouth 2 times daily for 7 days             metroNIDAZOLE (FLAGYL) 500 MG tablet  Take 1 tablet by mouth 3 times daily for 7 days                 Discharge Instructions: Follow up with Antionette Sheets DO in 2 weeks.     Hospital acquired Infections: None    Time spent for dc more than 30 min     Jaden BRADLEY attending

## 2022-07-28 ENCOUNTER — HOSPITAL ENCOUNTER (EMERGENCY)
Age: 47
Discharge: HOME | End: 2022-07-28
Attending: EMERGENCY MEDICINE

## 2022-07-28 VITALS
DIASTOLIC BLOOD PRESSURE: 85 MMHG | OXYGEN SATURATION: 99 % | BODY MASS INDEX: 28.14 KG/M2 | HEART RATE: 76 BPM | WEIGHT: 190 LBS | RESPIRATION RATE: 12 BRPM | HEIGHT: 69 IN | SYSTOLIC BLOOD PRESSURE: 125 MMHG

## 2022-07-28 DIAGNOSIS — H65.112 SUBACUTE ALLERGIC OTITIS MEDIA OF LEFT EAR, RECURRENCE NOT SPECIFIED: ICD-10-CM

## 2022-07-28 DIAGNOSIS — G51.0 BELL'S PALSY: Primary | ICD-10-CM

## 2022-07-28 PROCEDURE — 99283 EMERGENCY DEPT VISIT LOW MDM: CPT

## 2022-07-28 RX ORDER — PREDNISONE 20 MG/1
20 TABLET ORAL DAILY
Qty: 7 TABLET | Refills: 0 | Status: SHIPPED | OUTPATIENT
Start: 2022-07-28 | End: 2022-08-04

## 2022-07-28 RX ORDER — SULFACETAMIDE SODIUM 100 MG/ML
SOLUTION/ DROPS OPHTHALMIC
Qty: 1 ML | Refills: 0 | Status: SHIPPED | OUTPATIENT
Start: 2022-07-28 | End: 2022-08-07

## 2022-07-28 RX ORDER — AZITHROMYCIN 250 MG/1
TABLET, FILM COATED ORAL
Qty: 1 PACKET | Refills: 0 | Status: SHIPPED | OUTPATIENT
Start: 2022-07-28 | End: 2022-08-01

## 2022-07-28 NOTE — ED PROVIDER NOTES
[id number] [facility] ED  [address] [address] [facility] [address] [place] [address]  Phone: [phone]  Fax: [phone] [facility]      Pt Name: Edilma Romeo  MRN: [id number]  [place] 1975  Date of evaluation: 7/28/2022    CHIEF COMPLAINT       Chief Complaint   Patient presents with    Facial Droop       HISTORY OF PRESENT ILLNESS    Edilma Romeo is a 52 y.o. female who presents facial droop on the left for a couple days. States that she has noticed that her left face has been drooping and has noticed earache on that left side as well. Noted no fevers or chills she does note that her eye seems to be getting dry. Notes that she has a hard time closing her mouth on that side. No weakness numbness or tingling in any other location. Never had a stroke has never had a cardiac issue. She is a smoker. REVIEW OF SYSTEMS     Constitutional: No fevers or chills   HEENT: No sore throat, rhinorrhea, or earache   Eyes: No blurry vision or double vision no drainage   Cardiovascular: No chest pain or tachycardia   Respiratory: No wheezing or shortness of breath no cough   Gastrointestinal: No nausea, vomiting, diarrhea, constipation, or abdominal pain   : No hematuria or dysuria   Musculoskeletal: No swelling or pain   Skin: No rash   Neurological: No focal neurologic complaints, paresthesias or headache see above  PAST MEDICAL HISTORY    has a past medical history of Cancer ([facility] [address] GERD (gastroesophageal reflux disease), Wears glasses, and Wears partial dentures. SURGICAL HISTORY      has a past surgical history that includes Cervix surgery and Carpal tunnel release (Left, 6-17-14). CURRENT MEDICATIONS       Previous Medications    No medications on file       ALLERGIES     has No Known Allergies. FAMILY HISTORY     She indicated that her mother is alive. She indicated that her father is alive. family history includes Arthritis in her mother; High Blood Pressure in her mother.     SOCIAL HISTORY      reports that she has been smoking cigarettes. She has been smoking an average of .75 packs per day. She has never used smokeless tobacco. She reports current alcohol use. She reports current drug use. Drug: Marijuana [employer]). PHYSICAL EXAM       ED Triage Vitals [07/28/22 1014]   BP Temp Temp Source Heart Rate Resp SpO2 Height Weight   125/85 -- Oral 76 12 99 % 5' 9\" (1.753 m) 190 lb (86.2 kg)     Constitutional: Alert, oriented x3, nontoxic, answering questions appropriately, acting properly for age, in no acute distress   HEENT: Extraocular muscles intact, mucus membranes moist, TM on the left has serous otitis with mild to moderate EM bulging. The right side is within normal limits. , no posterior pharyngeal erythema or exudates, Pupils equal, round, reactive to light,   Neck: Trachea midline   Cardiovascular: Regular rhythm and rate no murmurs   Respiratory: Clear to auscultation bilaterally no wheezes, rhonchi, rales, no respiratory distress no tachypnea no retractions no hypoxia  Gastrointestinal: Soft, nontender, nondistended, positive bowel sounds. No rebound, rigidity, or guarding. Musculoskeletal: No extremity pain or swelling   Neurologic: Moving all 4 extremities without difficulty there is a facial nerve palsy on the left. Including non- wrinkled forehead looks up compared to the right side. Drooping of her eyelid as well. In the corner of her mouth. And protrudes without difficulty and is symmetrical.  Skin: Warm and dry      DIFFERENTIAL DIAGNOSIS/ MDM:     Bell's palsy facial nerve palsy. And otitis media    DIAGNOSTIC RESULTS     EKG: All EKG's are interpreted by the Emergency Department Physician who either signs or Co-signs this chart in the absence of a cardiologist.        Not indicated unless otherwise documented above    LABS:  No results found for this visit on 07/28/22.     Not indicated unless otherwise documented above    RADIOLOGY:   I reviewed the radiologist interpretations:    No orders to display       Not indicated unless otherwise documented above    EMERGENCY DEPARTMENT COURSE:     The patient was given the following medications:  Orders Placed This Encounter   Medications    predniSONE (DELTASONE) 20 MG tablet     Sig: Take 1 tablet by mouth in the morning for 7 days. Dispense:  7 tablet     Refill:  0    sulfacetamide (BLEPH-10) 10 % ophthalmic solution     Sig: Every 6 hours. Dispense:  1 mL     Refill:  0    azithromycin (ZITHROMAX Z-SALOME) 250 MG tablet     Sig: Take 2 tablets (500 mg) on Day 1, and then take 1 tablet (250 mg) on days 2 through 5. Dispense:  1 packet     Refill:  0        Vitals:   -------------------------  /85   Pulse 76   Resp 12   Ht 5' 9\" (1.753 m)   Wt 86.2 kg (190 lb)   LMP 07/25/2022 (Approximate)   SpO2 99%   BMI 28.06 kg/m²         I have reviewed the disposition diagnosis with the patient and or their family/guardian. I have answered their questions and given discharge instructions. They voiced understanding of these instructions and did not have any furtherquestions or complaints. CRITICAL CARE:    None    CONSULTS:    None    PROCEDURES:    None      OARRS Report if indicated             FINAL IMPRESSION      1. Bell's palsy    2. Subacute allergic otitis media of left ear, recurrence not specified          DISPOSITION/PLAN   DISPOSITION Decision To Discharge 07/28/2022 10:22:14 AM        CONDITION ON DISPOSITION: STABLE       PATIENT REFERRED TO:  Fabrizio Sheets, [address]  [address]  [facility] [id number] [phone]    In 3 days      DISCHARGE MEDICATIONS:  New Prescriptions    AZITHROMYCIN (ZITHROMAX Z-SALOME) 250 MG TABLET    Take 2 tablets (500 mg) on Day 1, and then take 1 tablet (250 mg) on days 2 through 5. PREDNISONE (DELTASONE) 20 MG TABLET    Take 1 tablet by mouth in the morning for 7 days. SULFACETAMIDE (BLEPH-10) 10 % OPHTHALMIC SOLUTION    Every 6 hours.

## 2022-07-28 NOTE — Clinical Note
Genoveva Marie was seen and treated in our emergency department on 7/28/2022. She may return to work on 08/01/2022. If you have any questions or concerns, please don't hesitate to call.       Delma Cuello MD